# Patient Record
Sex: FEMALE | Race: WHITE | NOT HISPANIC OR LATINO | Employment: UNEMPLOYED | URBAN - METROPOLITAN AREA
[De-identification: names, ages, dates, MRNs, and addresses within clinical notes are randomized per-mention and may not be internally consistent; named-entity substitution may affect disease eponyms.]

---

## 2024-01-01 ENCOUNTER — APPOINTMENT (OUTPATIENT)
Dept: RADIOLOGY | Facility: MEDICAL CENTER | Age: 54
DRG: 368 | End: 2024-01-01
Attending: INTERNAL MEDICINE
Payer: MEDICAID

## 2024-01-01 ENCOUNTER — HOSPITAL ENCOUNTER (INPATIENT)
Facility: MEDICAL CENTER | Age: 54
LOS: 1 days | End: 2024-05-30
Attending: STUDENT IN AN ORGANIZED HEALTH CARE EDUCATION/TRAINING PROGRAM | Admitting: INTERNAL MEDICINE
Payer: MEDICAID

## 2024-01-01 VITALS
OXYGEN SATURATION: 90 % | TEMPERATURE: 91.2 F | HEART RATE: 125 BPM | SYSTOLIC BLOOD PRESSURE: 92 MMHG | RESPIRATION RATE: 30 BRPM | BODY MASS INDEX: 33.93 KG/M2 | DIASTOLIC BLOOD PRESSURE: 55 MMHG | HEIGHT: 68 IN

## 2024-01-01 LAB
ABO + RH BLD: NORMAL
ABO GROUP BLD: ABNORMAL
ALBUMIN SERPL BCP-MCNC: 1.3 G/DL (ref 3.2–4.9)
ALBUMIN/GLOB SERPL: ABNORMAL G/DL
ALP SERPL-CCNC: 50 U/L (ref 30–99)
ALT SERPL-CCNC: 292 U/L (ref 2–50)
AMMONIA PLAS-SCNC: 338 UMOL/L (ref 11–45)
ANION GAP SERPL CALC-SCNC: 39 MMOL/L (ref 7–16)
APAP SERPL-MCNC: <5 UG/ML (ref 10–30)
AST SERPL-CCNC: 1466 U/L (ref 12–45)
BARCODED ABORH UBTYP: 5100
BARCODED ABORH UBTYP: 600
BARCODED ABORH UBTYP: 600
BARCODED ABORH UBTYP: 6200
BARCODED ABORH UBTYP: 7300
BARCODED ABORH UBTYP: 8400
BARCODED ABORH UBTYP: 9500
BARCODED PRD CODE UBPRD: NORMAL
BARCODED UNIT NUM UBUNT: NORMAL
BASE EXCESS BLDA CALC-SCNC: -23 MMOL/L (ref -4–3)
BASE EXCESS BLDA CALC-SCNC: -26 MMOL/L (ref -4–3)
BASE EXCESS BLDA CALC-SCNC: -28 MMOL/L (ref -4–3)
BILIRUB SERPL-MCNC: 2.2 MG/DL (ref 0.1–1.5)
BLD GP AB SCN SERPL QL: ABNORMAL
BODY TEMPERATURE: ABNORMAL DEGREES
BREATHS SETTING VENT: 30
BUN SERPL-MCNC: 19 MG/DL (ref 8–22)
CALCIUM ALBUM COR SERPL-MCNC: 11.1 MG/DL (ref 8.5–10.5)
CALCIUM SERPL-MCNC: 8.9 MG/DL (ref 8.5–10.5)
CFT BLD TEG: 6.6 MIN (ref 4.6–9.1)
CFT BLD TEG: 7.7 MIN (ref 4.6–9.1)
CFT P HPASE BLD TEG: 6.1 MIN (ref 4.3–8.3)
CFT P HPASE BLD TEG: 7.3 MIN (ref 4.3–8.3)
CHLORIDE SERPL-SCNC: 103 MMOL/L (ref 96–112)
CLOT ANGLE BLD TEG: 55 DEGREES (ref 63–78)
CLOT ANGLE BLD TEG: 67.1 DEGREES (ref 63–78)
CLOT LYSIS 30M P MA LENFR BLD TEG: 0 % (ref 0–2.6)
CLOT LYSIS 30M P MA LENFR BLD TEG: >22 % (ref 0–2.6)
CO2 BLDA-SCNC: 6 MMOL/L (ref 20–33)
CO2 BLDA-SCNC: 6 MMOL/L (ref 20–33)
CO2 BLDA-SCNC: 7 MMOL/L (ref 20–33)
CO2 SERPL-SCNC: 10 MMOL/L (ref 20–33)
COMPONENT CT 8504CT: NORMAL
COMPONENT CT 8504CT: NORMAL
COMPONENT F 8504F: NORMAL
COMPONENT FT 8504FT: NORMAL
COMPONENT P 8504P: NORMAL
COMPONENT R 8504R: NORMAL
CREAT SERPL-MCNC: 1.62 MG/DL (ref 0.5–1.4)
CT.EXTRINSIC BLD ROTEM: 2 MIN (ref 0.8–2.1)
CT.EXTRINSIC BLD ROTEM: >5 MIN (ref 0.8–2.1)
DELSYS IDSYS: ABNORMAL
END TIDAL CARBON DIOXIDE IECO2: 16 MMHG
ERYTHROCYTE [DISTWIDTH] IN BLOOD BY AUTOMATED COUNT: 49.5 FL (ref 35.9–50)
ERYTHROCYTE [DISTWIDTH] IN BLOOD BY AUTOMATED COUNT: 49.5 FL (ref 35.9–50)
ERYTHROCYTE [DISTWIDTH] IN BLOOD BY AUTOMATED COUNT: 56.1 FL (ref 35.9–50)
FIBRINOGEN PPP-MCNC: <60 MG/DL (ref 215–460)
GFR SERPLBLD CREATININE-BSD FMLA CKD-EPI: 38 ML/MIN/1.73 M 2
GLOBULIN SER CALC-MCNC: ABNORMAL G/DL (ref 1.9–3.5)
GLUCOSE BLD STRIP.AUTO-MCNC: 102 MG/DL (ref 65–99)
GLUCOSE BLD STRIP.AUTO-MCNC: 162 MG/DL (ref 65–99)
GLUCOSE BLD STRIP.AUTO-MCNC: 176 MG/DL (ref 65–99)
GLUCOSE BLD STRIP.AUTO-MCNC: 25 MG/DL (ref 65–99)
GLUCOSE SERPL-MCNC: 38 MG/DL (ref 65–99)
HCO3 BLDA-SCNC: 4.8 MMOL/L (ref 17–25)
HCO3 BLDA-SCNC: 5.1 MMOL/L (ref 17–25)
HCO3 BLDA-SCNC: 6.6 MMOL/L (ref 17–25)
HCT VFR BLD AUTO: 21.1 % (ref 37–47)
HCT VFR BLD AUTO: 42.6 % (ref 37–47)
HCT VFR BLD AUTO: 44.7 % (ref 37–47)
HGB BLD-MCNC: 13.4 G/DL (ref 12–16)
HGB BLD-MCNC: 14.2 G/DL (ref 12–16)
HGB BLD-MCNC: 6.4 G/DL (ref 12–16)
HOROWITZ INDEX BLDA+IHG-RTO: 128 MM[HG]
INR PPP: 5.15 (ref 0.87–1.13)
LACTATE BLD-SCNC: >20 MMOL/L (ref 0.5–2)
LACTATE BLD-SCNC: >20 MMOL/L (ref 0.5–2)
LACTATE SERPL-SCNC: 29.7 MMOL/L (ref 0.5–2)
LACTATE SERPL-SCNC: 30.3 MMOL/L (ref 0.5–2)
LACTATE SERPL-SCNC: 30.6 MMOL/L (ref 0.5–2)
MAGNESIUM SERPL-MCNC: 2.4 MG/DL (ref 1.5–2.5)
MCF BLD TEG: 51.2 MM (ref 52–69)
MCF BLD TEG: <40 MM (ref 52–69)
MCF.PLATELET INHIB BLD ROTEM: 14.3 MM (ref 15–32)
MCF.PLATELET INHIB BLD ROTEM: 7.3 MM (ref 15–32)
MCH RBC QN AUTO: 28.8 PG (ref 27–33)
MCH RBC QN AUTO: 29.5 PG (ref 27–33)
MCH RBC QN AUTO: 29.6 PG (ref 27–33)
MCHC RBC AUTO-ENTMCNC: 30.3 G/DL (ref 32.2–35.5)
MCHC RBC AUTO-ENTMCNC: 31.5 G/DL (ref 32.2–35.5)
MCHC RBC AUTO-ENTMCNC: 31.8 G/DL (ref 32.2–35.5)
MCV RBC AUTO: 91.6 FL (ref 81.4–97.8)
MCV RBC AUTO: 92.9 FL (ref 81.4–97.8)
MCV RBC AUTO: 97.7 FL (ref 81.4–97.8)
MODE IMODE: ABNORMAL
O2/TOTAL GAS SETTING VFR VENT: 60 %
PA AA BLD-ACNC: ABNORMAL % (ref 0–11)
PA AA BLD-ACNC: ABNORMAL % (ref 0–11)
PA ADP BLD-ACNC: ABNORMAL % (ref 0–17)
PA ADP BLD-ACNC: ABNORMAL % (ref 0–17)
PCO2 BLDA: 25.6 MMHG (ref 26–37)
PCO2 BLDA: 28.6 MMHG (ref 26–37)
PCO2 BLDA: 29.4 MMHG (ref 26–37)
PCO2 TEMP ADJ BLDA: 21.9 MMHG (ref 26–37)
PCO2 TEMP ADJ BLDA: 25.1 MMHG (ref 26–37)
PCO2 TEMP ADJ BLDA: 26.3 MMHG (ref 26–37)
PEEP END EXPIRATORY PRESSURE IPEEP: 8 CMH20
PH BLDA: 6.86 [PH] (ref 7.4–7.5)
PH BLDA: 6.88 [PH] (ref 7.4–7.5)
PH BLDA: 6.96 [PH] (ref 7.4–7.5)
PH TEMP ADJ BLDA: 6.88 [PH] (ref 7.4–7.5)
PH TEMP ADJ BLDA: 6.92 [PH] (ref 7.4–7.5)
PH TEMP ADJ BLDA: 7 [PH] (ref 7.4–7.5)
PHOSPHATE SERPL-MCNC: 9.6 MG/DL (ref 2.5–4.5)
PLATELET # BLD AUTO: 32 K/UL (ref 164–446)
PLATELET # BLD AUTO: 89 K/UL (ref 164–446)
PLATELET # BLD AUTO: 99 K/UL (ref 164–446)
PLATELETS.RETICULATED NFR BLD AUTO: 12.5 % (ref 0.6–13.1)
PLATELETS.RETICULATED NFR BLD AUTO: 3 % (ref 0.6–13.1)
PLATELETS.RETICULATED NFR BLD AUTO: 5.8 % (ref 0.6–13.1)
PMV BLD AUTO: 10.1 FL (ref 9–12.9)
PMV BLD AUTO: 10.4 FL (ref 9–12.9)
PMV BLD AUTO: 12.1 FL (ref 9–12.9)
PO2 BLDA: 139 MMHG (ref 64–87)
PO2 BLDA: 62 MMHG (ref 64–87)
PO2 BLDA: 77 MMHG (ref 64–87)
PO2 TEMP ADJ BLDA: 128 MMHG (ref 64–87)
PO2 TEMP ADJ BLDA: 48 MMHG (ref 64–87)
PO2 TEMP ADJ BLDA: 61 MMHG (ref 64–87)
POTASSIUM SERPL-SCNC: 4.4 MMOL/L (ref 3.6–5.5)
PRODUCT TYPE UPROD: NORMAL
PROT SERPL-MCNC: 2.4 G/DL (ref 6–8.2)
PROTHROMBIN TIME: 48.3 SEC (ref 12–14.6)
RBC # BLD AUTO: 2.16 M/UL (ref 4.2–5.4)
RBC # BLD AUTO: 4.65 M/UL (ref 4.2–5.4)
RBC # BLD AUTO: 4.81 M/UL (ref 4.2–5.4)
RH BLD: ABNORMAL
SAO2 % BLDA: 75 % (ref 93–99)
SAO2 % BLDA: 82 % (ref 93–99)
SAO2 % BLDA: 96 % (ref 93–99)
SODIUM SERPL-SCNC: 152 MMOL/L (ref 135–145)
SPECIMEN DRAWN FROM PATIENT: ABNORMAL
TEG ALGORITHM TGALG: ABNORMAL
TEG ALGORITHM TGALG: ABNORMAL
TIDAL VOLUME IVT: 390 ML
UNIT STATUS USTAT: NORMAL
WBC # BLD AUTO: 4.3 K/UL (ref 4.8–10.8)
WBC # BLD AUTO: 5.3 K/UL (ref 4.8–10.8)
WBC # BLD AUTO: 8.7 K/UL (ref 4.8–10.8)

## 2024-01-01 PROCEDURE — 85576 BLOOD PLATELET AGGREGATION: CPT | Mod: 91

## 2024-01-01 PROCEDURE — 37799 UNLISTED PX VASCULAR SURGERY: CPT

## 2024-01-01 PROCEDURE — 770001 HCHG ROOM/CARE - MED/SURG/GYN PRIV*

## 2024-01-01 PROCEDURE — 94799 UNLISTED PULMONARY SVC/PX: CPT

## 2024-01-01 PROCEDURE — 99153 MOD SED SAME PHYS/QHP EA: CPT

## 2024-01-01 PROCEDURE — 80143 DRUG ASSAY ACETAMINOPHEN: CPT

## 2024-01-01 PROCEDURE — P9012 CRYOPRECIPITATE EACH UNIT: HCPCS

## 2024-01-01 PROCEDURE — 82962 GLUCOSE BLOOD TEST: CPT | Mod: 91

## 2024-01-01 PROCEDURE — 86900 BLOOD TYPING SEROLOGIC ABO: CPT

## 2024-01-01 PROCEDURE — 86901 BLOOD TYPING SEROLOGIC RH(D): CPT

## 2024-01-01 PROCEDURE — 700105 HCHG RX REV CODE 258: Performed by: INTERNAL MEDICINE

## 2024-01-01 PROCEDURE — 85610 PROTHROMBIN TIME: CPT | Mod: 91

## 2024-01-01 PROCEDURE — 700101 HCHG RX REV CODE 250: Performed by: INTERNAL MEDICINE

## 2024-01-01 PROCEDURE — 82803 BLOOD GASES ANY COMBINATION: CPT | Mod: 91

## 2024-01-01 PROCEDURE — 83605 ASSAY OF LACTIC ACID: CPT | Mod: 91

## 2024-01-01 PROCEDURE — 85384 FIBRINOGEN ACTIVITY: CPT

## 2024-01-01 PROCEDURE — 36430 TRANSFUSION BLD/BLD COMPNT: CPT

## 2024-01-01 PROCEDURE — 84100 ASSAY OF PHOSPHORUS: CPT

## 2024-01-01 PROCEDURE — 700111 HCHG RX REV CODE 636 W/ 250 OVERRIDE (IP): Mod: JZ

## 2024-01-01 PROCEDURE — 700117 HCHG RX CONTRAST REV CODE 255: Performed by: INTERNAL MEDICINE

## 2024-01-01 PROCEDURE — 85347 COAGULATION TIME ACTIVATED: CPT | Mod: 91

## 2024-01-01 PROCEDURE — 700111 HCHG RX REV CODE 636 W/ 250 OVERRIDE (IP): Performed by: INTERNAL MEDICINE

## 2024-01-01 PROCEDURE — 82140 ASSAY OF AMMONIA: CPT

## 2024-01-01 PROCEDURE — 99292 CRITICAL CARE ADDL 30 MIN: CPT | Mod: 25 | Performed by: INTERNAL MEDICINE

## 2024-01-01 PROCEDURE — 94003 VENT MGMT INPAT SUBQ DAY: CPT

## 2024-01-01 PROCEDURE — 80053 COMPREHEN METABOLIC PANEL: CPT | Mod: 91

## 2024-01-01 PROCEDURE — P9017 PLASMA 1 DONOR FRZ W/IN 8 HR: HCPCS | Mod: 91

## 2024-01-01 PROCEDURE — P9034 PLATELETS, PHERESIS: HCPCS

## 2024-01-01 PROCEDURE — C1751 CATH, INF, PER/CENT/MIDLINE: HCPCS

## 2024-01-01 PROCEDURE — 03HY32Z INSERTION OF MONITORING DEVICE INTO UPPER ARTERY, PERCUTANEOUS APPROACH: ICD-10-PCS | Performed by: INTERNAL MEDICINE

## 2024-01-01 PROCEDURE — P9016 RBC LEUKOCYTES REDUCED: HCPCS | Mod: 91

## 2024-01-01 PROCEDURE — 0DJ08ZZ INSPECTION OF UPPER INTESTINAL TRACT, VIA NATURAL OR ARTIFICIAL OPENING ENDOSCOPIC: ICD-10-PCS | Performed by: INTERNAL MEDICINE

## 2024-01-01 PROCEDURE — 5A1935Z RESPIRATORY VENTILATION, LESS THAN 24 CONSECUTIVE HOURS: ICD-10-PCS | Performed by: STUDENT IN AN ORGANIZED HEALTH CARE EDUCATION/TRAINING PROGRAM

## 2024-01-01 PROCEDURE — 85027 COMPLETE CBC AUTOMATED: CPT | Mod: 91

## 2024-01-01 PROCEDURE — 36556 INSERT NON-TUNNEL CV CATH: CPT | Performed by: INTERNAL MEDICINE

## 2024-01-01 PROCEDURE — 86850 RBC ANTIBODY SCREEN: CPT

## 2024-01-01 PROCEDURE — 05HM33Z INSERTION OF INFUSION DEVICE INTO RIGHT INTERNAL JUGULAR VEIN, PERCUTANEOUS APPROACH: ICD-10-PCS | Performed by: INTERNAL MEDICINE

## 2024-01-01 PROCEDURE — 36620 INSERTION CATHETER ARTERY: CPT | Performed by: INTERNAL MEDICINE

## 2024-01-01 PROCEDURE — 71275 CT ANGIOGRAPHY CHEST: CPT

## 2024-01-01 PROCEDURE — 160048 HCHG OR STATISTICAL LEVEL 1-5: Performed by: INTERNAL MEDICINE

## 2024-01-01 PROCEDURE — 85055 RETICULATED PLATELET ASSAY: CPT | Mod: 91

## 2024-01-01 PROCEDURE — 71045 X-RAY EXAM CHEST 1 VIEW: CPT

## 2024-01-01 PROCEDURE — 700111 HCHG RX REV CODE 636 W/ 250 OVERRIDE (IP): Mod: JZ | Performed by: INTERNAL MEDICINE

## 2024-01-01 PROCEDURE — 86923 COMPATIBILITY TEST ELECTRIC: CPT | Mod: 91

## 2024-01-01 PROCEDURE — 160202 HCHG ENDO MINUTES - 1ST 30 MINS LEVEL 3: Performed by: INTERNAL MEDICINE

## 2024-01-01 PROCEDURE — 36556 INSERT NON-TUNNEL CV CATH: CPT

## 2024-01-01 PROCEDURE — C9113 INJ PANTOPRAZOLE SODIUM, VIA: HCPCS | Performed by: INTERNAL MEDICINE

## 2024-01-01 PROCEDURE — 99254 IP/OBS CNSLTJ NEW/EST MOD 60: CPT | Performed by: INTERNAL MEDICINE

## 2024-01-01 PROCEDURE — 36620 INSERTION CATHETER ARTERY: CPT

## 2024-01-01 PROCEDURE — 43255 EGD CONTROL BLEEDING ANY: CPT | Performed by: INTERNAL MEDICINE

## 2024-01-01 PROCEDURE — 99291 CRITICAL CARE FIRST HOUR: CPT | Mod: 25 | Performed by: INTERNAL MEDICINE

## 2024-01-01 PROCEDURE — 94002 VENT MGMT INPAT INIT DAY: CPT

## 2024-01-01 PROCEDURE — 83735 ASSAY OF MAGNESIUM: CPT

## 2024-01-01 PROCEDURE — 85384 FIBRINOGEN ACTIVITY: CPT | Mod: 91

## 2024-01-01 PROCEDURE — 160207 HCHG ENDO MINUTES - EA ADDL 1 MIN LEVEL 3: Performed by: INTERNAL MEDICINE

## 2024-01-01 RX ORDER — MIDAZOLAM HYDROCHLORIDE 1 MG/ML
1-5 INJECTION INTRAMUSCULAR; INTRAVENOUS
Status: DISCONTINUED | OUTPATIENT
Start: 2024-01-01 | End: 2024-05-31 | Stop reason: HOSPADM

## 2024-01-01 RX ORDER — LORAZEPAM 2 MG/ML
1 CONCENTRATE ORAL
Status: DISCONTINUED | OUTPATIENT
Start: 2024-01-01 | End: 2024-05-31 | Stop reason: HOSPADM

## 2024-01-01 RX ORDER — DEXTROSE MONOHYDRATE 25 G/50ML
50 INJECTION, SOLUTION INTRAVENOUS ONCE
Status: COMPLETED | OUTPATIENT
Start: 2024-01-01 | End: 2024-01-01

## 2024-01-01 RX ORDER — PHENYLEPHRINE HCL IN 0.9% NACL 1 MG/10 ML
400 SYRINGE (ML) INTRAVENOUS ONCE
Status: COMPLETED | OUTPATIENT
Start: 2024-01-01 | End: 2024-01-01

## 2024-01-01 RX ORDER — SODIUM BICARBONATE IN D5W 150/1000ML
PLASTIC BAG, INJECTION (ML) INTRAVENOUS CONTINUOUS
Status: DISCONTINUED | OUTPATIENT
Start: 2024-01-01 | End: 2024-05-31 | Stop reason: HOSPADM

## 2024-01-01 RX ORDER — MIDAZOLAM HYDROCHLORIDE 1 MG/ML
INJECTION INTRAMUSCULAR; INTRAVENOUS
Status: COMPLETED
Start: 2024-01-01 | End: 2024-01-01

## 2024-01-01 RX ORDER — GLYCOPYRROLATE 0.2 MG/ML
0.2 INJECTION INTRAMUSCULAR; INTRAVENOUS 3 TIMES DAILY PRN
Status: DISCONTINUED | OUTPATIENT
Start: 2024-01-01 | End: 2024-05-31 | Stop reason: HOSPADM

## 2024-01-01 RX ORDER — GLYCOPYRROLATE 1 MG/1
1 TABLET ORAL 3 TIMES DAILY PRN
Status: DISCONTINUED | OUTPATIENT
Start: 2024-01-01 | End: 2024-05-31 | Stop reason: HOSPADM

## 2024-01-01 RX ORDER — EPINEPHRINE HCL IN 0.9 % NACL 4MG/250ML
0-.5 PLASTIC BAG, INJECTION (ML) INTRAVENOUS CONTINUOUS
Status: DISCONTINUED | OUTPATIENT
Start: 2024-01-01 | End: 2024-05-31 | Stop reason: HOSPADM

## 2024-01-01 RX ORDER — ONDANSETRON 2 MG/ML
8 INJECTION INTRAMUSCULAR; INTRAVENOUS EVERY 8 HOURS PRN
Status: DISCONTINUED | OUTPATIENT
Start: 2024-01-01 | End: 2024-05-31 | Stop reason: HOSPADM

## 2024-01-01 RX ORDER — THIAMINE HYDROCHLORIDE 100 MG/ML
100 INJECTION, SOLUTION INTRAMUSCULAR; INTRAVENOUS DAILY
Status: DISCONTINUED | OUTPATIENT
Start: 2024-06-02 | End: 2024-05-31 | Stop reason: HOSPADM

## 2024-01-01 RX ORDER — BISACODYL 10 MG
10 SUPPOSITORY, RECTAL RECTAL
Status: DISCONTINUED | OUTPATIENT
Start: 2024-01-01 | End: 2024-05-31 | Stop reason: HOSPADM

## 2024-01-01 RX ORDER — HYDROMORPHONE HYDROCHLORIDE 1 MG/ML
.5-2 INJECTION, SOLUTION INTRAMUSCULAR; INTRAVENOUS; SUBCUTANEOUS
Status: DISCONTINUED | OUTPATIENT
Start: 2024-01-01 | End: 2024-01-01

## 2024-01-01 RX ORDER — FAMOTIDINE 20 MG/1
20 TABLET, FILM COATED ORAL EVERY 12 HOURS
Status: DISCONTINUED | OUTPATIENT
Start: 2024-01-01 | End: 2024-01-01

## 2024-01-01 RX ORDER — ROCURONIUM BROMIDE 10 MG/ML
100 INJECTION, SOLUTION INTRAVENOUS ONCE
Status: COMPLETED | OUTPATIENT
Start: 2024-01-01 | End: 2024-01-01

## 2024-01-01 RX ORDER — POLYETHYLENE GLYCOL 3350 17 G/17G
1 POWDER, FOR SOLUTION ORAL
Status: DISCONTINUED | OUTPATIENT
Start: 2024-01-01 | End: 2024-05-31 | Stop reason: HOSPADM

## 2024-01-01 RX ORDER — DEXMEDETOMIDINE HYDROCHLORIDE 4 UG/ML
.1-1.5 INJECTION, SOLUTION INTRAVENOUS CONTINUOUS
Status: DISCONTINUED | OUTPATIENT
Start: 2024-01-01 | End: 2024-05-31 | Stop reason: HOSPADM

## 2024-01-01 RX ORDER — AMOXICILLIN 250 MG
2 CAPSULE ORAL 2 TIMES DAILY
Status: DISCONTINUED | OUTPATIENT
Start: 2024-01-01 | End: 2024-05-31 | Stop reason: HOSPADM

## 2024-01-01 RX ORDER — ATROPINE SULFATE 10 MG/ML
2 SOLUTION/ DROPS OPHTHALMIC EVERY 4 HOURS PRN
Status: DISCONTINUED | OUTPATIENT
Start: 2024-01-01 | End: 2024-05-31 | Stop reason: HOSPADM

## 2024-01-01 RX ORDER — MIDAZOLAM HYDROCHLORIDE 1 MG/ML
4 INJECTION INTRAMUSCULAR; INTRAVENOUS ONCE
Status: COMPLETED | OUTPATIENT
Start: 2024-01-01 | End: 2024-01-01

## 2024-01-01 RX ORDER — NOREPINEPHRINE BITARTRATE 0.03 MG/ML
0-1 INJECTION, SOLUTION INTRAVENOUS CONTINUOUS
Status: DISCONTINUED | OUTPATIENT
Start: 2024-01-01 | End: 2024-05-31 | Stop reason: HOSPADM

## 2024-01-01 RX ORDER — CALCIUM CHLORIDE 100 MG/ML
1 INJECTION INTRAVENOUS; INTRAVENTRICULAR ONCE
Status: COMPLETED | OUTPATIENT
Start: 2024-01-01 | End: 2024-01-01

## 2024-01-01 RX ORDER — ONDANSETRON 4 MG/1
8 TABLET, ORALLY DISINTEGRATING ORAL EVERY 8 HOURS PRN
Status: DISCONTINUED | OUTPATIENT
Start: 2024-01-01 | End: 2024-05-31 | Stop reason: HOSPADM

## 2024-01-01 RX ADMIN — NOREPINEPHRINE BITARTRATE 1 MCG/KG/MIN: 1 INJECTION INTRAVENOUS at 16:45

## 2024-01-01 RX ADMIN — PANTOPRAZOLE SODIUM 8 MG/HR: 40 INJECTION, POWDER, FOR SOLUTION INTRAVENOUS at 11:28

## 2024-01-01 RX ADMIN — SODIUM BICARBONATE 100 MEQ: 84 INJECTION INTRAVENOUS at 10:15

## 2024-01-01 RX ADMIN — SODIUM BICARBONATE 100 MEQ: 84 INJECTION INTRAVENOUS at 11:45

## 2024-01-01 RX ADMIN — NOREPINEPHRINE BITARTRATE 1 MCG/KG/MIN: 1 INJECTION INTRAVENOUS at 14:54

## 2024-01-01 RX ADMIN — THIAMINE HYDROCHLORIDE 500 MG: 100 INJECTION, SOLUTION INTRAMUSCULAR; INTRAVENOUS at 18:37

## 2024-01-01 RX ADMIN — OCTREOTIDE ACETATE 50 MCG/HR: 200 INJECTION, SOLUTION INTRAVENOUS; SUBCUTANEOUS at 11:28

## 2024-01-01 RX ADMIN — EPINEPHRINE 0.5 MCG/KG/MIN: 1 INJECTION INTRAMUSCULAR; INTRAVENOUS; SUBCUTANEOUS at 12:20

## 2024-01-01 RX ADMIN — PANTOPRAZOLE SODIUM 8 MG/HR: 40 INJECTION, POWDER, FOR SOLUTION INTRAVENOUS at 21:46

## 2024-01-01 RX ADMIN — MIDAZOLAM HYDROCHLORIDE 4 MG: 1 INJECTION, SOLUTION INTRAMUSCULAR; INTRAVENOUS at 13:53

## 2024-01-01 RX ADMIN — Medication 400 MCG: at 13:53

## 2024-01-01 RX ADMIN — MIDAZOLAM HYDROCHLORIDE 5 MG: 1 INJECTION, SOLUTION INTRAMUSCULAR; INTRAVENOUS at 22:46

## 2024-01-01 RX ADMIN — IOHEXOL 100 ML: 350 INJECTION, SOLUTION INTRAVENOUS at 12:57

## 2024-01-01 RX ADMIN — SODIUM BICARBONATE 50 MEQ: 84 INJECTION INTRAVENOUS at 12:57

## 2024-01-01 RX ADMIN — Medication 100 MEQ: at 10:15

## 2024-01-01 RX ADMIN — SODIUM BICARBONATE 50 MEQ: 84 INJECTION INTRAVENOUS at 10:18

## 2024-01-01 RX ADMIN — SODIUM BICARBONATE: 84 INJECTION, SOLUTION INTRAVENOUS at 12:07

## 2024-01-01 RX ADMIN — MIDAZOLAM HYDROCHLORIDE 4 MG: 1 INJECTION INTRAMUSCULAR; INTRAVENOUS at 13:53

## 2024-01-01 RX ADMIN — SODIUM BICARBONATE 100 MEQ: 84 INJECTION INTRAVENOUS at 13:54

## 2024-01-01 RX ADMIN — NOREPINEPHRINE BITARTRATE 0.5 MCG/KG/MIN: 1 INJECTION INTRAVENOUS at 19:47

## 2024-01-01 RX ADMIN — NOREPINEPHRINE BITARTRATE 1 MCG/KG/MIN: 1 INJECTION INTRAVENOUS at 11:45

## 2024-01-01 RX ADMIN — MORPHINE SULFATE 10 MG: 10 INJECTION INTRAVENOUS at 22:46

## 2024-01-01 RX ADMIN — ROCURONIUM BROMIDE 100 MG: 10 SOLUTION INTRAVENOUS at 13:51

## 2024-01-01 RX ADMIN — SODIUM BICARBONATE 100 MEQ: 84 INJECTION INTRAVENOUS at 11:53

## 2024-01-01 RX ADMIN — CALCIUM CHLORIDE 1 G: 100 INJECTION, SOLUTION INTRAVENOUS at 13:57

## 2024-01-01 RX ADMIN — DEXTROSE MONOHYDRATE 50 ML: 25 INJECTION, SOLUTION INTRAVENOUS at 10:25

## 2024-01-01 RX ADMIN — SODIUM BICARBONATE 50 MEQ: 84 INJECTION INTRAVENOUS at 13:51

## 2024-01-01 RX ADMIN — SODIUM CHLORIDE 1000 MG: 900 INJECTION INTRAVENOUS at 18:15

## 2024-01-01 ASSESSMENT — PAIN DESCRIPTION - PAIN TYPE
TYPE: ACUTE PAIN

## 2024-01-10 PROBLEM — Z78.0 POST-MENOPAUSAL: Status: ACTIVE | Noted: 2024-01-01

## 2024-05-30 PROBLEM — J96.01 ACUTE RESPIRATORY FAILURE WITH HYPOXIA (HCC): Status: ACTIVE | Noted: 2024-01-01

## 2024-05-30 PROBLEM — D62 ACUTE BLOOD LOSS ANEMIA: Status: ACTIVE | Noted: 2024-01-01

## 2024-05-30 PROBLEM — R57.8 HEMORRHAGIC SHOCK (HCC): Status: ACTIVE | Noted: 2024-01-01

## 2024-05-30 PROBLEM — E87.20 LACTIC ACIDOSIS: Status: ACTIVE | Noted: 2024-01-01

## 2024-05-30 PROBLEM — F10.20 ALCOHOL DEPENDENCE (HCC): Status: ACTIVE | Noted: 2024-01-01

## 2024-05-30 PROBLEM — N17.9 ACUTE KIDNEY INJURY (HCC): Status: ACTIVE | Noted: 2024-01-01

## 2024-05-30 PROBLEM — K92.2 GI BLEED: Status: ACTIVE | Noted: 2024-01-01

## 2024-05-30 NOTE — ASSESSMENT & PLAN NOTE
Intubated 5/30  ABCDEF bundle  She is not a candidate for SAT/SBT at this time  Mobility level 1 at this time

## 2024-05-30 NOTE — ASSESSMENT & PLAN NOTE
S/P 4 units of PRBCs at outside hospital  I transfused a total of 5 red boxes over the course of the day  I administered IV bicarbonate solution and IV calcium chloride immediately on arrival  I am titrating norepinephrine and epinephrine to keep mean arterial pressure greater than 65

## 2024-05-30 NOTE — PROCEDURES
OPERATIVE REPORT    PATIENT:   Giselle GOSS Robert   1970       PREOPERATIVE DIAGNOSES/INDICATIONS: massive hemorrhage, alcohol use disorder, history of duodenal ulcers    POSTOPERATIVE DIAGNOSIS: esophageal Dieulafoy vs. Esophageal varix with nipple actively bleeding    PROCEDURE:  ESOPHAGOGASTRODUODENOSCOPY with failed band ligation x 2    PHYSICIAN:  Christine Thornton MD    ANESTHESIA:  Per intensivist    LOCATION: [facility]    CONSENT:  Emergent 2 MD consent    DESCRIPTION: The patient was seen bedside in the ICU.  Patient is intubated on mechanical ventilation.  Intensivist was bedside for procedure in addition to several ICU RNs infusing blood and critical care medications.  Vital signs were being monitored every 5 minutes.  A time out was called.  Bite block was in place.  The 2T therapeutic endoscope was inserted into the esophagus.      Once the site was reached and examined, the upper endoscope was withdrawn.  Retroflexion was made within the stomach.  The stomach was decompressed, scope was withdrawn and the procedure was terminated.    OPERATIVE FINDINGS:    1. Esophagus: after aggressive lavage and suction a nipple like lesion was noted to be actively bleeding at approximately 34 cm.  Poor visualization due to massive volume of blood in lumen.  The endoscope was changed to a 1 T therapeutic scope and the banding device was applied.  Suspect patient may have decompressed esophageal varices since mucosa could not be captured to place band twice.  Discussion with intensivist that hemoclipping would not be appropriate on a varix nor epinephrine and cautery and patient should go to IR.  .  Procedure was stopped after IR contacted and agreed to emergent CTA with possible treatment of bleeding lesion.    2. Stomach: large amount of clot and fresh blood in proximal stomach and unable to visualize mucosa in upper third of stomach.  No active bleeding in body or antrum.  3. Duodenum: No active bleeding in lumen and  no mucosal lesions appreciated.    RECOMMENDATIONS:  Patient has received her second Red Box during this procedure  Continue octreotide and pantopraozle  Patient going to CTA stat.  I have discussed with IR.        Procedure time was 55 minutes which is 40 minutes longer than standard EGD

## 2024-05-30 NOTE — CONSULTS
Gastroenterology Initial Consult Note               Author:  Christine Thornton M.D. Date & Time Created: 5/30/2024 11:04 AM       Patient ID:  Name:             Giselle Jones  YOB: 1970  Age:                 54 y.o.  female  MRN:               [id number]      Referring Provider:  ***        Presenting Chief Complaint:  ***      History of Present Illness:    This is a 54 y.o. female transferred to [facility] today after being found down and an approximate liter of blood next to her.  EMS reports SBP 30's and she was taken to [facility] where she was intubated.  She was started on epinephrine and given 6 units PRBC and an additional 3 units with EMS.  She has been given 4 units PRBC here for a total of 9 units.  Additionally she has received FFP and platelets.    CHART REVIEW  She had an ultrasound in December at [facility] and had which demonstrated fatty change to liver, patent portal vein with PV 12mm and no ascites.  .   EGD on Jan 8, 2024 at [facility] demonstrated 2 duodenal bulb ulcers wilthout bleeding and erosive gastritis.  Biopsies were negative for H pylori.  She has a history of AUD and started drinking prior to hospitalization in [facility].    She was seen at [facility] GI clinic on April 29 and reported not doing well.  She had an episode of hematemesis 1 week prior to that visit.  She also had mnelena days after the hematemesis.  She admitted to missing doses of pantoprazole.  She was noted to have multiple ecchymmoses on physical exam.  Admitted to taking ibuprofen and drinking 3 vodka dinks daily        Review of Systems:  Review of Systems   Unable to perform ROS: Critical illness             Past Medical History:  Past Medical History:   Diagnosis Date    Bowel habit changes     Per pt on 1/10/24, no evidence of overt blood in stool since discharge from hospital on 1/9/24.    CKD (chronic kidney disease), stage I     Heart burn     Upper GI bleed, hospitalized 01/07/24.  Discharged with pantoprazole and sucralfate.    Hypothyroidism     Not currently taking medication. Off per PCP instruction. Las taken Summer/Fall 2023.    Psychiatric problem     Anxiety & Depression    Shortness of breath 05/05/2016    Pt denies any SOB on 1/10/24. Pt thinks this past dx was secondary to allergies and sinus infections.     Active Hospital Problems    Diagnosis     GI bleed [K92.2]          Past Surgical History:  Past Surgical History:   Procedure Laterality Date    ORIF, WRIST Right 1/12/2024    Procedure: OPEN REDUCTION INTERNAL FIXATION OF RIGHT WRIST DISTAL RADIUS;  Surgeon: Jered Craig M.D.;  Location: [facility] [place];  Service: Orthopedics    ORIF, ANKLE Left 9/25/2023    Procedure: OPEN REDUCTION INTERNAL FIXATION LEFT BIMALLEOLAR ANKLE FRACTURE, LEFT ANKLE DELTOID REPAIR, LEFT ANKLE SYNDESMOSIS REPAIR, LEFT COLATERAL LIGAMENT REPAIR;  Surgeon: Thien Bee M.D.;  Location: SURGERY [place];  Service: Orthopedics    TONSILLECTOMY AND ADENOIDECTOMY             Hospital Medications:  Current Facility-Administered Medications   Medication Dose Frequency Provider Last Rate Last Admin    pantoprazole (Protonix) 80 mg in  mL continuous infusion  8 mg/hr Continuous Jerrica Peters D.O.        octreotide (SandoSTATIN) 1,250 mcg in  mL Infusion  50 mcg/hr Continuous Jerrica Peters D.O.        Respiratory Therapy Consult   Continuous RT Jerrica Peters D.O.        famotidine (Pepcid) tablet 20 mg  20 mg Q12HRS Jerrica Peters D.O.        Or    famotidine (Pepcid) injection 20 mg  20 mg Q12HRS YULI OkeefeO.        senna-docusate (Pericolace Or Senokot S) 8.6-50 MG per tablet 2 Tablet  2 Tablet BID Jerrica Peters D.O.        And    polyethylene glycol/lytes (Miralax) Packet 1 Packet  1 Packet QDAY PRN Jerrica Peters D.O.        And    magnesium hydroxide (Milk Of Magnesia) suspension 30 mL  30 mL QDAY PRN Jerrica Peters D.O.        And     bisacodyl (Dulcolax) suppository 10 mg  10 mg QDAY PRN Jerrica Peters D.O.        MD Alert...ICU Electrolyte Replacement per Pharmacy   PHARMACY TO DOSE Jerrica Petesr D.O.        lidocaine (Xylocaine) 1 % injection 2 mL  2 mL Q30 MIN PRN Jerrica Peters D.O.       Last reviewed on 5/30/2024  8:26 AM by Nelsy Escobedo R.N.       Current Outpatient Medications:  Medications Prior to Admission   Medication Sig Dispense Refill Last Dose    gabapentin (NEURONTIN) 300 MG Cap TAKE 1 CAPSULE BY MOUTH THREE TIMES DAILY AS NEEDED FOR PAIN NOT  CONTROLLED  BY  TYLENOL  FOR  UP  TO  15  DAYS 45 Capsule 0     ondansetron (ZOFRAN ODT) 4 MG TABLET DISPERSIBLE Take 1 Tablet by mouth every 6 hours as needed for Nausea/Vomiting. 10 Tablet 0     sucralfate (CARAFATE) 1 GM Tab Take 1 g by mouth.       pantoprazole (PROTONIX) 40 MG Tablet Delayed Response Take 40 mg by mouth.       ketoconazole (NIZORAL) 2 % shampoo WASH FOREHEAD, EYEBROWS AND AROUND THE EARS DAILY, ALLOW TO SIT 1-2 MINUTES PRIOR TO RINSING (Patient not taking: Reported on 5/14/2024)       triamcinolone acetonide (KENALOG) 0.1 % Cream APPLY CREAM EXTERNALLY TWICE DAILY FOR 14 DAYS AND THEN DECREASE TO ONCE DAILY WITH A MAX USE OF 5 DAYS PER WEEK (Patient not taking: Reported on 5/14/2024)       diphenhydrAMINE-APAP, sleep, (TYLENOL PM EXTRA STRENGTH PO) Take  by mouth.       acetaminophen (TYLENOL) 500 MG Tab Take 2 Tablets by mouth every 8 hours. 30 Tablet 0     vitamin D2, Ergocalciferol, (DRISDOL) 1.25 MG (36204 UT) Cap capsule TAKE 1 CAPSULE BY MOUTH EVERY TWO WEEKS       loratadine/pseudo SR (PX ALLERGY RELIEF D, LORATID,) 5-120 MG TABLET SR 12 HR Take 1 Tablet by mouth 2 times a day.       potassium chloride SA (KDUR) 20 MEQ Tab CR Take 1 Tablet by mouth 2 times a day. 14 Tablet 0          Medication Allergies:  Allergies   Allergen Reactions    Latex Rash     Welts and rash     Oxycodone-Acetaminophen Vomiting     Per pt, she has tolerated  oxycodone and acetaminophen individually.    Pcn [Penicillins] Hives    Sulfa Drugs Hives     Reaction as a child    Tape Rash     Adhesives tend to cause rash         Family Medical History:  Family History   Problem Relation Age of Onset    Arthritis Mother     Psychiatric Illness Mother     Heart Disease Father     Hypertension Father     Psychiatric Illness Brother          Social History:  Social History     Socioeconomic History    Marital status:      Spouse name: Not on file    Number of children: Not on file    Years of education: 14    Highest education level: Some college, no degree   Occupational History    Not on file   Tobacco Use    Smoking status: Never    Smokeless tobacco: Never   Vaping Use    Vaping status: Never Used   Substance and Sexual Activity    Alcohol use: Not Currently     Comment: Average 2 drinks per month    Drug use: No    Sexual activity: Yes     Partners: Male   Other Topics Concern     Service No    Blood Transfusions No    Caffeine Concern No    Occupational Exposure No    Hobby Hazards No    Sleep Concern Yes    Stress Concern Yes    Weight Concern Yes    Special Diet No    Back Care No    Exercise No    Bike Helmet No    Seat Belt Yes    Self-Exams Yes   Social History Narrative    Not on file     Social Determinants of Health     Financial Resource Strain: High Risk (9/19/2023)    Overall Financial Resource Strain (CARDIA)     Difficulty of Paying Living Expenses: Very hard   Food Insecurity: Not on file (1/8/2024)   Recent Concern: Food Insecurity - High Risk (1/8/2024)    Received from [facility]    SINCERE Food Insecurity     In the last month, did you feel there was not enough money for food?: Yes   Transportation Needs: Not on file (1/8/2024)   Physical Activity: Patient Declined (9/19/2023)    Exercise Vital Sign     Days of Exercise per Week: Patient declined     Minutes of Exercise per Session: Patient declined   Stress: Patient Declined  "(9/19/2023)    [employer] - Occupational Stress Questionnaire     Feeling of Stress : Patient declined   Social Connections: Moderately Isolated (9/19/2023)    Social Connection and Isolation Panel [NHANES]     Frequency of Communication with Friends and Family: More than three times a week     Frequency of Social Gatherings with Friends and Family: Twice a week     Attends Spiritism Services: Never     Active Member of Clubs or Organizations: No     Attends Club or Organization Meetings: 1 to 4 times per year     Marital Status:    Intimate Partner Violence: Low Risk  (1/7/2024)    Received from [facility]    History of Abuse     Have you ever been afraid of, threatened, neglected, or abused by someone?: No   Housing Stability: Not on file (1/8/2024)   Recent Concern: Housing Stability - High Risk (1/8/2024)    Received from [facility]    SINCERE Housing Needs     In the last month, was there a time when you were not able to pay your mortgage or rent?: Yes     In the last month, have you slept outside, in a shelter, in a car, or any place not meant for sleeping?: Not on file         Vital signs:  Weight/BMI: Body mass index is 33.93 kg/m².  /63   Pulse (!) 111   Resp (!) 31   Ht 1.727 m (5' 7.99\")   SpO2 100%   Vitals:    05/30/24 1054 05/30/24 1056 05/30/24 1058 05/30/24 1100   BP: (!) 142/64 138/67 (!) 140/66 136/63   Pulse: (!) 112 (!) 112 (!) 112 (!) 111   Resp: (!) 32 (!) 34 (!) 29 (!) 31   SpO2: 100% 100% 100% 100%   Height:         Oxygen Therapy:  Pulse Oximetry: 100 %, FiO2%: 100 %  No intake or output data in the 24 hours ending 05/30/24 1104      Physical Exam:  Physical Exam  Constitutional:       Comments: Obtunded, agonal breathing, fresh blood coming from mouth and suction with fresh blood per rectum   HENT:      Head: Normocephalic and atraumatic.      Nose: Nose normal.      Mouth/Throat:      Mouth: Mucous membranes are " dry.   Cardiovascular:      Rate and Rhythm: Regular rhythm. Tachycardia present.   Pulmonary:      Breath sounds: Rhonchi present.      Comments: intubated  Abdominal:      General: There is distension.      Palpations: Abdomen is soft.   Skin:     Comments: Toes are dusky                 Labs:  Recent Labs     05/30/24 0723   SODIUM 145   POTASSIUM 4.9   CHLORIDE 106   CO2 9*   BUN 18   CREATININE 1.9*   CALCIUM 7.5*     Recent Labs     05/30/24 0723   ALTSGPT 119*   ASTSGOT 693*   ALKPHOSPHAT 90   TBILIRUBIN 3.5*   GLUCOSE 98     Recent Labs     05/30/24  0723   WBC 4.6*   ASTSGOT 693*   ALTSGPT 119*   ALKPHOSPHAT 90   TBILIRUBIN 3.5*     Recent Labs     05/30/24 0723   RBC 2.16*   HEMOGLOBIN 6.2*   HEMATOCRIT 22.8*   PLATELETCT 74*   PROTHROMBTM 19.2*   APTT 36.3*   INR 1.92     Recent Results (from the past 24 hour(s))   CBC WITH DIFFERENTIAL    Collection Time: 05/30/24  7:23 AM   Result Value Ref Range    WBC 4.6 (L) 4.8 - 10.8 K/uL    RBC 2.16 (L) 4.20 - 5.40 M/uL    Hemoglobin 6.2 (LL) 13.0 - 17.0 g/dL    Hematocrit 22.8 (L) 39.0 - 50.0 %    .6 (H) 81.0 - 99.0 fL    MCH 28.7 27.0 - 31.0 pg    MCHC 27.2 (L) 33.0 - 37.0 g/dL    RDW 17.0 (H) 11.5 - 14.5 %    Platelet Count 74 (L) 130 - 400 K/uL    MPV 13.0 (H) 7.4 - 10.4 fL    Neutrophils Automated 26.0 (L) 39.0 - 70.0 %    Lymphocytes Automated 64.8 (H) 21.0 - 50.0 %    Monocytes Automated 4.8 2.0 - 9.0 %    Eosinophils Automated 0.0 0.0 - 5.0 %    Basophils Automated 0.4 0.0 - 3.0 %    Abs Neutrophils Automated 1.2 (L) 1.8 - 7.7 K/uL    Abs Lymph Automated 3.0 1.2 - 4.8 K/uL    Eosinophil Count, Blood 0.00 0.00 - 0.50 K/uL   DIAGNOSTIC ALCOHOL    Collection Time: 05/30/24  7:23 AM   Result Value Ref Range    Ethyl Alcohol -Ethanol  Etoh 151 (H) 0 - 10 mg/dL   Comp Metabolic Panel    Collection Time: 05/30/24  7:23 AM   Result Value Ref Range    Sodium 145 136 - 145 mmol/L    Potassium 4.9 3.5 - 5.1 mmol/L    Chloride 106 98 - 107 mmol/L    Co2 9  (LL) 21 - 32 mmol/L    Anion Gap 35 (H) 10 - 18 mmol/L    Glucose 98 74 - 99 mg/dL    Bun 18 7 - 18 mg/dL    Creatinine 1.9 (H) 0.6 - 1.0 mg/dL    Calcium 7.5 (L) 8.5 - 11.0 mg/dL    AST(SGOT) 693 (H) 15 - 37 U/L    ALT(SGPT) 119 (H) 12 - 78 U/L    Alkaline Phosphatase 90 46 - 116 U/L    Total Bilirubin 3.5 (H) 0.2 - 1.0 mg/dL    Albumin 1.5 (L) 3.4 - 5.0 g/dL    Total Protein 4.1 (L) 6.4 - 8.2 g/dL    A-G Ratio 0.6    Prothrombin Time    Collection Time: 05/30/24  7:23 AM   Result Value Ref Range    PT 19.2 (H) 9.3 - 11.7 sec    INR 1.92    APTT    Collection Time: 05/30/24  7:23 AM   Result Value Ref Range    APTT 36.3 (H) 22.8 - 31.5 sec   LACTIC ACID    Collection Time: 05/30/24  7:23 AM   Result Value Ref Range    Lactic Acid 26.5 (HH) 0.4 - 2.0 mmol/L   VENOUS BLOOD GAS    Collection Time: 05/30/24  7:23 AM   Result Value Ref Range    Venous Bg Ph 6.79 (LL) 7.31 - 7.45    Venous Bg Pco2 34.3 (L) 41.0 - 51.0 mmHg    Venous Bg Po2 111.6 (H) 25.0 - 40.0 mmHg    Venous Bg O2 Saturation 94.8 %    Venous Bg Hco3 5 (L) 24 - 28 mmol/L    Venous Bg Base Excess -28 mmol/L   HCG QUANTITATIVE    Collection Time: 05/30/24  7:23 AM   Result Value Ref Range    Bhcg <2.0 0.0 - 6.0 mIU/mL   ESTIMATED GFR    Collection Time: 05/30/24  7:23 AM   Result Value Ref Range    GFR (CKD-EPI) 31 (A) >60 mL/min/1.73 m 2   Prepare RBC    Collection Time: 05/30/24  7:23 AM   Result Value Ref Range    Crossmatch Unit 1 Compatible     Crossmatch Unit 2 Compatible     Crossmatch Unit 3 Compatible     Crossmatch Unit 4 Compatible     Packed Red Cell 1 Transfused     Packed Red Cell 2 Transfused     Packed Red Cell 3 Transfused     Packed Red Cell 4 Transfused    Prepare FFP    Collection Time: 05/30/24  7:23 AM   Result Value Ref Range    FFP Unit #1 Transfused    Prepare RBC    Collection Time: 05/30/24  7:30 AM   Result Value Ref Range    Crossmatch Unit 1 Compatible     Crossmatch Unit 2 Compatible     Packed Red Cell 1 Transfused      Packed Red Cell 2 Transfused    ARTERIAL BLOOD GAS    Collection Time: 05/30/24  8:40 AM   Result Value Ref Range    Ph 6.85 (LL) 7.35 - 7.45    Pco2 28 (L) 35 - 45 mmHg    Po2 154 (H) 65 - 85 mmHg    O2 Saturation 98.0 (H) 89.0 - 94.0 %    Hco3 4.8 (L) 20.0 - 29.0 mmol/L    Base Excess -26.7 (L) -3.0 - 4.0 mmol/L   MASSIVE TRANSFUSION    Collection Time: 05/30/24 10:21 AM   Result Value Ref Range    Component F       LP                  LiquidPlasmaIRR     [id number]   issued       05/30/24   10:21      Product Type LP     Dispense Status issued     Unit Number (Barcoded) [id number]     Product Code (Barcoded) V4636Y16     Blood Type (Barcoded) 0600     Component F       LP                  LiquidPlasmaIRR     [id number]   issued       05/30/24   10:21      Product Type LP     Dispense Status issued     Unit Number (Barcoded) [id number]     Product Code (Barcoded) [id number]     Blood Type (Barcoded) 6200     Component F       LP                  LiquidPlasmaIRR     [id number]   issued       05/30/24   10:21      Product Type LP     Dispense Status issued     Unit Number (Barcoded) [id number]     Product Code (Barcoded) K1007D32     Blood Type (Barcoded) 6200     Component F       LP                  LiquidPlasmaIRR     [id number]   issued       05/30/24   10:21      Product Type LP     Dispense Status issued     Unit Number (Barcoded) [id number]     Product Code (Barcoded) N2610P62     Blood Type (Barcoded) 6200     Component P       P71                 Plts,Pheresis       [id number]   issued       05/30/24   10:21      Product Type Platelets Pheresis LR     Dispense Status issued     Unit Number (Barcoded) [id number]     Product Code (Barcoded) R6248J02     Blood Type (Barcoded) 6200     Component R       R4                  Red Blood Cells     [id number]   issued       05/30/24   10:21      Product Type Red Blood Cells LR Pheresis     Dispense Status issued     Unit Number  (Barcoded) [id number]     Product Code (Barcoded) B0656G71     Blood Type (Barcoded) 9500     Component R       R99                 Red Cells, LR       [id number]   issued       05/30/24   10:21      Product Type R99     Dispense Status issued     Unit Number (Barcoded) [id number]     Product Code (Barcoded) U2355J86     Blood Type (Barcoded) 9500     Component R       R4                  Red Blood Cells     [id number]   issued       05/30/24   10:21      Product Type Red Blood Cells LR Pheresis     Dispense Status issued     Unit Number (Barcoded) [id number]     Product Code (Barcoded) Q0997B46     Blood Type (Barcoded) 9500     Component R       R99                 Red Cells, LR       [id number]   issued       05/30/24   10:21      Product Type R99     Dispense Status issued     Unit Number (Barcoded) [id number]     Product Code (Barcoded) X6526O12     Blood Type (Barcoded) 9500          Radiology Review:  No orders to display         MDM (Data Review):   -Records reviewed and summarized in current documentation  -I personally reviewed and interpreted the laboratory results  -I personally reviewed the radiology images    Assessment/Recommendations:    Hemorrhagic shock  Hematemesis with hematochezia  Acute blood loss anemia, s/p 13 units PRBC  Acute respiratory failure  Thrombocytopenia  Lactic acidosis  Transaminitis:  ischemic injury +/- alcoholic hepatitis  AUD  Severe hypoalbuminemia:  ?due to massive bleeding +/- alcohol abuse  Coagulopathy      RECS:  Emergent bedside EGD in ICU  Pantoprazole drip  May need octreotide drip  Further recommendations based on findings          Christine Thornton M.D.          Core Quality Measures   Reviewed items:  Labs, Medications and Radiology reports reviewed

## 2024-05-30 NOTE — PROCEDURES
Date of service:  5/30/2024    Title:  Central venous catheter placement - internal jugular vein    Indication: Hemorrhagic shock    Narrative:    A time out was performed identifying the correct patient, correct procedure and correct location prior to this procedure.    The right neck was prepped with chlorhexidine and draped in the usual sterile fashion.  1% Xylocaine solution was used for topical anesthesia.  A 9 F Arrowg+jack Blue MAC multilumen central venous catheter was placed into the right internal jugular vein under ultrasound guidance using the technique described by Kris without difficulty or apparent complication.  The guidewire was removed intact.  The line was sutured into place and a sterile dressing was placed over the line.  All ports flush and return venous blood easily.  The patient tolerated the procedure quite nicely.  No complications are apparent.  A STAT CXR is ordered to confirm placement.  The catheter may be safely used for infusion and medication administration.    This was an EMERGENT procedure.  It was medically necessary to perform this procedure emergently to prevent life threatening deterioration.      Dinesh Joaquin MD  Pulmonary and Critical Care Medicine

## 2024-05-30 NOTE — PROGRESS NOTES
Patient arrived to room by care flight, intensivist called to bedside, patient connected to monitor

## 2024-05-30 NOTE — ASSESSMENT & PLAN NOTE
History of duodenal ulcer on prior EGD in January 2024  Emergent EGD with bleeding esophageal varix on 5/30 - this was not able to be banded  Emergent placement of [place] tube on 5/30  Octreotide infusion  Pantoprazole infusion  Trend hemoglobin and transfuse PRBCs to be hemoglobin greater than 7  Trend thromboelastogram with platelet mapping and transfuse additional blood products based upon interpretation

## 2024-05-30 NOTE — PROGRESS NOTES
[facility] DIRECT ADMIT PROGRESS NOTE    Transferring facility: [place]  Transferring provider: Dr. Harp    Chief complaint: GIB  Pertinent history & patient course:   54F with history of etoh abuse, GIB (duodenal ulcers) who presented with profound hemorrhagic shock and GIB. She was found down by her boyfriend (unknown period of down time) with apparently a liter of bloody vomit by her. She was noted to be hypotensive to SBP 30s and hypoxic by EMS - given epi pushes and bagged with bloody emesis while bagging her. She was intubated in the ER and femoral cordis was placed. She was started on epi gtt and given 4u pRBCs. Hgb was 6 prior to transfusions and pending repeat. Also noted to be having active melena.  She's not on any sedation post intubation but noted pupils at 4mm and fixed and non-responsive currently.   Pertinent imaging & lab results: as noted above  Consultants called prior to transfer and pertinent input from consultants:   GI reviewed case and recommended transfer to regional for possible CTA A/P and IR intervention if needed.  Code Status: FULL per transferring provider, I personally verified with the transferring provider patient's code status and the transferring provider has confirmed this with the patient.  Reason for Transfer: GI bleed  Further work up or recommendations requested prior to transfer:   - give 1u FFP and platelets since she got 4u pRBCs while pending transfer    Patient accepted for transfer: Yes  Accepting [facility] Facility: [facility] - Nursing to notify the Triage Coordinator in the RTOC via Voalte or Phone ext. [phone] when patient arrives to the unit. The Triage Coordinator will assign the admitting provider.    Consultants to be called upon arrival: GI  Admission status: Inpatient.   Floor requested: ICU    The admitting provider is the point of contact for questions or concerns regarding the patient's care.         Megan Magaña MD  Pulmonary  and Critical Care Medicine  Formerly [facility]

## 2024-05-30 NOTE — H&P
PULMONARY AND CRITICAL CARE MEDICINE HISTORY AND PHYSICAL EXAMINATION    Date of Admission:  5/30/2024    Admitting Physician:  Dinesh Joaquin MD    Reason for Admission: Hemorrhagic shock due to massive upper gastrointestinal hemorrhage    Chief Complaint: Hemorrhagic shock due to massive upper gastrointestinal hemorrhage    History of Present Illness:    I was kindly asked to see and evaluate Giselle Jones, a 54 y.o. female for evaluation and management of the above problem.    The entire history is obtained from healthcare providers and the medical record as this lady cannot provide me with any history.  This lady has a history of alcohol dependence with continued drinking, duodenal ulcers in January 2024, depression and hypothyroidism.  She apparently was found down by her boyfriend earlier this morning.  She has been having some vomiting with tremors.  EMS at the scene reported approximately half a liter of bright red blood present in her emesis.  She was hypotensive in the field with a systolic blood pressure in the 30s.  She was hypoxemic with oxygen saturations in the 50s.  She was taken to an outside facility and intubated.  She was transfused 4 units of packed red blood cells.  Her hemoglobin at the outside facility was 6.2 with a platelet count of 74,000.  She had a creatinine of 1.9 and a lactic acid of 26.5.  Her blood alcohol was 0.151.  She was transferred to [facility] for subspecialty care.    Medications Prior to Admission:    No current facility-administered medications on file prior to encounter.     Current Outpatient Medications on File Prior to Encounter   Medication Sig Dispense Refill    gabapentin (NEURONTIN) 300 MG Cap TAKE 1 CAPSULE BY MOUTH THREE TIMES DAILY AS NEEDED FOR PAIN NOT  CONTROLLED  BY  TYLENOL  FOR  UP  TO  15  DAYS 45 Capsule 0    ondansetron (ZOFRAN ODT) 4 MG TABLET DISPERSIBLE Take 1 Tablet by mouth every 6 hours as needed for Nausea/Vomiting. 10 Tablet 0     sucralfate (CARAFATE) 1 GM Tab Take 1 g by mouth.      pantoprazole (PROTONIX) 40 MG Tablet Delayed Response Take 40 mg by mouth.      ketoconazole (NIZORAL) 2 % shampoo WASH FOREHEAD, EYEBROWS AND AROUND THE EARS DAILY, ALLOW TO SIT 1-2 MINUTES PRIOR TO RINSING (Patient not taking: Reported on 5/14/2024)      triamcinolone acetonide (KENALOG) 0.1 % Cream APPLY CREAM EXTERNALLY TWICE DAILY FOR 14 DAYS AND THEN DECREASE TO ONCE DAILY WITH A MAX USE OF 5 DAYS PER WEEK (Patient not taking: Reported on 5/14/2024)      diphenhydrAMINE-APAP, sleep, (TYLENOL PM EXTRA STRENGTH PO) Take  by mouth.      acetaminophen (TYLENOL) 500 MG Tab Take 2 Tablets by mouth every 8 hours. 30 Tablet 0    vitamin D2, Ergocalciferol, (DRISDOL) 1.25 MG (16191 UT) Cap capsule TAKE 1 CAPSULE BY MOUTH EVERY TWO WEEKS      loratadine/pseudo SR (PX ALLERGY RELIEF D, LORATID,) 5-120 MG TABLET SR 12 HR Take 1 Tablet by mouth 2 times a day.      potassium chloride SA (KDUR) 20 MEQ Tab CR Take 1 Tablet by mouth 2 times a day. 14 Tablet 0       Current Medications:      Current Facility-Administered Medications:     pantoprazole (Protonix) 80 mg in  mL continuous infusion, 8 mg/hr, Intravenous, Continuous, YULI OkeefeO., Last Rate: 10 mL/hr at 05/30/24 1128, 8 mg/hr at 05/30/24 1128    octreotide (SandoSTATIN) 1,250 mcg in  mL Infusion, 50 mcg/hr, Intravenous, Continuous, YULI OkeefeO., Last Rate: 10 mL/hr at 05/30/24 1128, 50 mcg/hr at 05/30/24 1128    Respiratory Therapy Consult, , Nebulization, Continuous RT, Jerrica Peters D.O.    senna-docusate (Pericolace Or Senokot S) 8.6-50 MG per tablet 2 Tablet, 2 Tablet, Enteral Tube, BID **AND** polyethylene glycol/lytes (Miralax) Packet 1 Packet, 1 Packet, Enteral Tube, QDAY PRN **AND** magnesium hydroxide (Milk Of Magnesia) suspension 30 mL, 30 mL, Enteral Tube, QDAY PRN **AND** bisacodyl (Dulcolax) suppository 10 mg, 10 mg, Rectal, QDAY PRN, Jerrica Peters,  BELGICA.    MD Alert...ICU Electrolyte Replacement per Pharmacy, , Other, PHARMACY TO DOSE, Jerrica Peters D.O.    lidocaine (Xylocaine) 1 % injection 2 mL, 2 mL, Tracheal Tube, Q30 MIN PRN, Jerrica Peters D.O.    thiamine (B-1) 500 mg in dextrose 5% 100 mL IVPB, 500 mg, Intravenous, DAILY **FOLLOWED BY** [START ON 6/2/2024] thiamine (B-1) injection 100 mg, 100 mg, Intravenous, DAILY, Dinesh Joaquin M.D.    dexmedetomidine (PRECEDEX) 400 mcg/100mL NS premix infusion, 0.1-1.5 mcg/kg/hr (Ideal), Intravenous, Continuous, Dinesh Joaquin M.D., Held at 05/30/24 1145    HYDROmorphone (Dilaudid) injection 0.5-2 mg, 0.5-2 mg, Intravenous, Q HOUR PRN, Dinesh Joaquin M.D.    norepinephrine (Levophed) 8 mg in 250 mL NS infusion (premix), 0-1 mcg/kg/min (Ideal), Intravenous, Continuous, Dinesh Joaquin M.D., Last Rate: 119.8 mL/hr at 05/30/24 1645, 1 mcg/kg/min at 05/30/24 1645    sodium bicarbonate 150 mEq in D5W infusion (premix), , Intravenous, Continuous, Dinesh Joaquin M.D., Last Rate: 250 mL/hr at 05/30/24 1207, New Bag at 05/30/24 1207    EPINEPHrine (Adrenalin) infusion 4 mg/250 mL (premix), 0-0.5 mcg/kg/min (Ideal), Intravenous, Continuous, Dinesh Joaquin M.D., Last Rate: 12 mL/hr at 05/30/24 1643, 0.05 mcg/kg/min at 05/30/24 1643    Tranexamic Acid (Cyklokapron) 1,000 mg in  mL IVPB, 1,000 mg, Intravenous, Once, Dinesh Joaquin M.D.    Allergies:    Latex, Oxycodone-acetaminophen, Pcn [penicillins], Sulfa drugs, and Tape    Past Surgical History:    Past Surgical History:   Procedure Laterality Date    UT UPPER GI ENDOSCOPY,DIAGNOSIS  5/30/2024    Procedure: GASTROSCOPY;  Surgeon: Christine Thornton M.D.;  Location: SURGERY SAME DAY [facility];  Service: Gastroenterology    ORIF, WRIST Right 1/12/2024    Procedure: OPEN REDUCTION INTERNAL FIXATION OF RIGHT WRIST DISTAL RADIUS;  Surgeon: Jered Craig M.D.;  Location: SURGERY [place];  Service:  Orthopedics    ORIF, ANKLE Left 9/25/2023    Procedure: OPEN REDUCTION INTERNAL FIXATION LEFT BIMALLEOLAR ANKLE FRACTURE, LEFT ANKLE DELTOID REPAIR, LEFT ANKLE SYNDESMOSIS REPAIR, LEFT COLATERAL LIGAMENT REPAIR;  Surgeon: Thien Bee M.D.;  Location: SURGERY [place];  Service: Orthopedics    TONSILLECTOMY AND ADENOIDECTOMY         Past Medical History:    Past Medical History:   Diagnosis Date    Bowel habit changes     Per pt on 1/10/24, no evidence of overt blood in stool since discharge from hospital on 1/9/24.    CKD (chronic kidney disease), stage I     Heart burn     Upper GI bleed, hospitalized 01/07/24. Discharged with pantoprazole and sucralfate.    Hypothyroidism     Not currently taking medication. Off per PCP instruction. Las taken Summer/Fall 2023.    Psychiatric problem     Anxiety & Depression    Shortness of breath 05/05/2016    Pt denies any SOB on 1/10/24. Pt thinks this past dx was secondary to allergies and sinus infections.       Social History:    Social History     Socioeconomic History    Marital status:      Spouse name: Not on file    Number of children: Not on file    Years of education: 14    Highest education level: Some college, no degree   Occupational History    Not on file   Tobacco Use    Smoking status: Never    Smokeless tobacco: Never   Vaping Use    Vaping status: Never Used   Substance and Sexual Activity    Alcohol use: Not Currently     Comment: Average 2 drinks per month    Drug use: No    Sexual activity: Yes     Partners: Male   Other Topics Concern     Service No    Blood Transfusions No    Caffeine Concern No    Occupational Exposure No    Hobby Hazards No    Sleep Concern Yes    Stress Concern Yes    Weight Concern Yes    Special Diet No    Back Care No    Exercise No    Bike Helmet No    Seat Belt Yes    Self-Exams Yes   Social History Narrative    Not on file     Social Determinants of Health     Financial Resource Strain: High Risk (9/19/2023)     Overall Financial Resource Strain (CARDIA)     Difficulty of Paying Living Expenses: Very hard   Food Insecurity: Not on file (1/8/2024)   Recent Concern: Food Insecurity - High Risk (1/8/2024)    Received from [facility]    SINCERE Food Insecurity     In the last month, did you feel there was not enough money for food?: Yes   Transportation Needs: Not on file (1/8/2024)   Physical Activity: Patient Declined (9/19/2023)    Exercise Vital Sign     Days of Exercise per Week: Patient declined     Minutes of Exercise per Session: Patient declined   Stress: Patient Declined (9/19/2023)    Citizen of [place] [employer] - Occupational Stress Questionnaire     Feeling of Stress : Patient declined   Social Connections: Moderately Isolated (9/19/2023)    Social Connection and Isolation Panel [NHANES]     Frequency of Communication with Friends and Family: More than three times a week     Frequency of Social Gatherings with Friends and Family: Twice a week     Attends Baptism Services: Never     Active Member of Clubs or Organizations: No     Attends Club or Organization Meetings: 1 to 4 times per year     Marital Status:    Intimate Partner Violence: Low Risk  (1/7/2024)    Received from [facility]    History of Abuse     Have you ever been afraid of, threatened, neglected, or abused by someone?: No   Housing Stability: Not on file (1/8/2024)   Recent Concern: Housing Stability - High Risk (1/8/2024)    Received from [facility]    SINCERE Housing Needs     In the last month, was there a time when you were not able to pay your mortgage or rent?: Yes     In the last month, have you slept outside, in a shelter, in a car, or any place not meant for sleeping?: Not on file       Family History:    Family History   Problem Relation Age of Onset    Arthritis Mother     Psychiatric Illness Mother     Heart Disease Father     Hypertension Father     Psychiatric Illness  "Brother        Review of System:    Review of Systems   Unable to perform ROS: Acuity of condition       Physical Examination:    BP 99/54   Pulse 100   Temp (!) 32.9 °C (91.2 °F) (Temporal)   Resp (!) 30   Ht 1.727 m (5' 7.99\")   LMP 01/01/2022 (Approximate)   SpO2 93%   BMI 33.93 kg/m²   Physical Exam  Constitutional:       Appearance: She is obese. She is ill-appearing.      Comments: On ventilator   HENT:      Mouth/Throat:      Mouth: Mucous membranes are dry.   Cardiovascular:      Comments: Sinus tachycardia  Pulmonary:      Breath sounds: Rales (Scattered crackles) present. No wheezing.   Abdominal:      General: There is distension.   Musculoskeletal:      Right lower leg: No edema.      Left lower leg: No edema.   Skin:     Coloration: Skin is pale.   Neurological:      Comments: She is unresponsive         Laboratory Data:    Recent Labs     05/30/24  0840 05/30/24  1126 05/30/24  1509   FQHBM21X 6.85*  --   --    UETWTG772I 28*  --   --    [id number] 154*  --   --    SPBN3SPM 98.0*  --   --    ARTHCO3 4.8*  --   --    ARTBE -26.7*  --   --    ISTATAPH  --  6.859* 6.957*   ISTATAPCO2  --  28.6 29.4   ISTATAPO2  --  139* 62*   ISTATATCO2  --  6* 7*   [id number]  --  96 75*   ISTATARTHCO3  --  5.1* 6.6*   ISTATARTBE  --  -26* -23*   ISTATTEMP  --  35.1 C 33.4 C   ISTATSPEC  --  Arterial Arterial   ISTATAPHTC  --  6.880* 7.000*   [id number]  --  128* 48*     Recent Labs     05/30/24  0723 05/30/24  1020 05/30/24  1709   WBC 4.1* 8.7 5.3   RBC 2.16* 2.16* 4.81   HEMOGLOBIN 6.2* 6.4* 14.2   HEMATOCRIT 22.8* 21.1* 44.7   .6* 97.7 92.9   MCH 28.7 29.6 29.5   MCHC 27.2* 30.3* 31.8*   RDW 17.0* 56.1* 49.5   PLATELETCT 74* 32* 99*   MPV 13.0* 12.1 10.1     Recent Labs     05/30/24  0723 05/30/24  1020   SODIUM 145 152*   POTASSIUM 4.9 4.4   CHLORIDE 106 103   CO2 9* 10*   GLUCOSE 98 38*   BUN 18 19   CREATININE 1.9* 1.62*   CALCIUM 7.5* 8.9                   Imaging:    I personally viewed all the " available CXR and CT scan images as well as reviewed the radiology interpretation reports.    DX-ABDOMEN FOR TUBE PLACEMENT   Final Result      1.  Enteric tube tip projects over the upper mid abdomen likely in the distal stomach.      CT-CTA COMPLETE THORACOABDOMINAL AORTA   Final Result      1.  No evidence of thoracic or abdominal aortic aneurysm or dissection.   2.  No evidence of active bleeding identified in the region of the esophagus or stomach.   3.  There is a fluid-filled distended esophagus with some internal density which would be consistent with blood products as was seen on recent EGD.   4.  There is a fluid filled distended stomach with blood products and an air-fluid level.   5.  There are distal esophageal and gastrohepatic ligament varices.   6.  There is mild hepatomegaly with diffuse hepatic steatosis.   7.  There are a few tiny gallstones with no biliary dilatation.   8.  Fluid-filled loops of large and small bowel can be seen with gastroenteritis or diarrhea. No gross bowel obstruction.            DX-CHEST-PORTABLE (1 VIEW)   Final Result      1.  Satisfactory appearance of the tubes and lines without pneumothorax.   2.  Mildly enlarged cardiac silhouette with central vascular congestion and probable right lower lobe atelectasis.      DX-ABDOMEN FOR TUBE PLACEMENT   Final Result      1.  Enteric tube tip appears to project over the midline distal abdomen and probably is within a distended stomach.          Assessment and Plan:    * Hemorrhagic shock (HCC)  Assessment & Plan  S/P 4 units of PRBCs at outside hospital  I transfused a total of 5 red boxes over the course of the day  I administered IV bicarbonate solution and IV calcium chloride immediately on arrival  I am titrating norepinephrine and epinephrine to keep mean arterial pressure greater than 65    Acute respiratory failure with hypoxia (HCC)  Assessment & Plan  Intubated 5/30  ABCDEF bundle  She is not a candidate for SAT/SBT at  this time  Mobility level 1 at this time    GI bleed- (present on admission)  Assessment & Plan  History of duodenal ulcer on prior EGD in January 2024  Emergent EGD with bleeding esophageal varix on 5/30 - this was not able to be banded  Emergent placement of [place] tube on 5/30  Octreotide infusion  Pantoprazole infusion  Trend hemoglobin and transfuse PRBCs to be hemoglobin greater than 7  Trend thromboelastogram with platelet mapping and transfuse additional blood products based upon interpretation    Acute kidney injury (HCC)  Assessment & Plan  Due to hemorrhagic shock  Monitor renal function and urine output  Avoid nephrotoxins    Lactic acidosis  Assessment & Plan  Due to hemorrhagic shock    Acute blood loss anemia  Assessment & Plan  Gastrointestinal source of blood loss  Trend hemoglobin and transfuse PRBCs to be hemoglobin greater than 7    Alcohol dependence (HCC)  Assessment & Plan  History of alcohol dependence and blood alcohol of 0.151 on presentation to outside facility  High-dose IV thiamine and supplemental vitamins  Cessation counseling when clinically appropriate        High risk of deterioration and worsening vital organ dysfunction and death without the above critical care interventions.    I have assessed and reassessed her respiratory status with ventilator adjustments, airway mechanics, ventilator waveforms, blood pressure, hemodynamics, cardiovascular status with titration of epinephrine and norepinephrine, serial determinations of her hemoglobin, platelet count and coagulation status with transfusion of multiple blood products.  She is at high risk for worsening respiratory, cardiovascular, gastrointestinal, renal and CNS system dysfunction.    The patient is critically ill.  Critical care time = 225 minutes in directly providing and coordinating critical care and extensive data review.  No time overlap and excludes procedures.    Discussed with Dr. Thornton, FATIMAH. RT, Clinical  pharmacist, Family    Dinesh Joaquin MD  Pulmonary and Critical Care Medicine

## 2024-05-30 NOTE — ASSESSMENT & PLAN NOTE
Gastrointestinal source of blood loss  Trend hemoglobin and transfuse PRBCs to be hemoglobin greater than 7

## 2024-05-30 NOTE — CARE PLAN
Problem: Ventilation  Goal: Ability to achieve and maintain unassisted ventilation or tolerate decreased levels of ventilator support  Description: Target End Date:  4 days     Document on Vent flowsheet    1.  Support and monitor invasive and noninvasive mechanical ventilation  2.  Monitor ventilator weaning response  3.  Perform ventilator associated pneumonia prevention interventions  4.  Manage ventilation therapy by monitoring diagnostic test results  Outcome: Not Met     Ventilator Daily Summary    Vent Day #1  Airway: 7.5    Ventilator settings: 30 [phone] 50  Weaning trials: no  Respiratory Procedures:     Plan: Continue current ventilator settings and wean mechanical ventilation as tolerated per physician orders.

## 2024-05-30 NOTE — PROCEDURES
Date of service:  5/30/2024    Title:  Arterial catheter placement - axillary artery    Indication: Hemorrhagic shock    Narrative:    A time out was performed identifying the correct patient, correct procedure and correct location for this procedure prior to performing this procedure.    The right arm was prepped with chlorhexidine and draped in the usual sterile fashion.  A 20 gauge arterial catheter was placed into the right axillary artery under ultrasound guidance using the technique described by Kris without difficulty or apparent complication.  The guidewire was removed intact.  The line was sutured into place and a sterile dressing was placed over the line.  An outstanding arterial waveform is present on the monitor.  The patient tolerated the procedure quite nicely.  No complications are apparent.    This was an EMERGENT procedure.  It was medically necessary to perform this procedure emergently to prevent life threatening deterioration.      Dinesh Joaquin MD  Pulmonary and Critical Care Medicine

## 2024-05-30 NOTE — PROGRESS NOTES
Patient transferred to IR1 table, attached to monitors.  ICU RN and RT with patient.  Procedure canceled. Patient transferred back to ICU by ICU RN and RT.

## 2024-05-30 NOTE — PROGRESS NOTES
Summary of course events of day: (Late entry)     1020: central line placement by MD Joaquin, 1023 timeout, 1034 wire out    1025: Blood sugar check of 25, order received for 1 amp D50, repeat blood sugar 15 minutes following administration 175.    1030: Patient hypotensive, uncontrolled bright red emesis noted. Red box #1 transfused per massive transfusion protocol     1055: Arterial line by MD Joaquin. 1058 time out, 1103 wire out.     1150: Patient continues to be hypotensive despite titration of epinephrine and norepinephrine, massive amounts of bright red emesis continue to be present. Red box #2 transfused per massive transfusion protocol.     1230: MD Thornton to bedside for bedside endoscopy    1253: Red box #3 transfused    1320: Patient transported to CT scanner with RT, x4 RN on monitor and portable ventilator.     1400: Red box #4 transfused    1500: [place] tube inserted by MD Joaquin    1520: Red box #5 massive transfusion protocol initiated. After administration of 4 units PRBCs, vital signs stable, patient hypertensive systolic 140s. Orders received by Wilmer Pinto to terminate transfusion, TEG and CBC lab orders received.

## 2024-05-31 NOTE — PROGRESS NOTES
Late entry:     Lab notified this RN of critical labs. RN with Dr. Joaquin, phone called placed on speaker. Critical lactic of 29.7, critical glucose of 32, Ammonia of 338, AST of 1466, Fibrinogen < 60, LY > 22. D50 given, red boxes transfused, cryo given, Continuous fluids initiated at 250 ml/hr.

## 2024-05-31 NOTE — DISCHARGE SUMMARY
Death Summary    Cause of Death  Hemorrhagic shock due to gastrointestinal hemorrhage due to esophageal varix due to alcoholic liver disease.    Comorbid Conditions at the Time of Death  Principal Problem:    Hemorrhagic shock (HCC) (POA: Unknown)  Active Problems:    GI bleed (POA: Yes)    Acute respiratory failure with hypoxia (HCC) (POA: Unknown)    Acute blood loss anemia (POA: Unknown)    Lactic acidosis (POA: Unknown)    Acute kidney injury (HCC) (POA: Unknown)    Alcohol dependence (HCC) (POA: Unknown)  Resolved Problems:    * No resolved hospital problems. *      History of Presenting Illness and Hospital Course  This is a 54 y.o. female admitted 2024 with hemorrhagic shock due to an upper gastrointestinal hemorrhage.  She was emergently transfused using the [employer]o Rapid Infuser through an emergently placed large bore central venous catheter.  She received a massive amount of blood products including PRBCs, FFP, platelets and cryoprecipitate.  She additionally received TXA.  An emergent EGD in the ICU revealed that she was actively bleeding from her esophagus.  She appeared to be bleeding from an esophageal varix.  A CTA of the aorta did not reveal active extravasation, but revealed evidence of alcoholic liver disease with distal esophageal and gastrohepatic ligament varices.  A [place] tube was placed to control ongoing hemorrhaging.  High-dose vasopressors were titrated.  Goals of care discussions were held with her family.  Tragically, it had become evident that this lady would not survive.  She was transition to comfort measures only and  peacefully.    Death Date: 24   Death Time:          Pronounced By (RN1):   Pronounced By (RN2): Ramone Joaquin MD  Pulmonary and Critical Care Medicine

## 2024-05-31 NOTE — PROGRESS NOTES
4 eyes skin note - patient too unstable to evaluate backside for full 4 eyes skin assessment. [place] tube in place requiring minimal patient movement.     Front of skin assessed, pressure points offloaded with pillows, foam pad placed under [place] tube

## 2024-05-31 NOTE — CARE PLAN
The patient is Unstable - High likelihood or risk of patient condition declining or worsening    Shift Goals  Clinical Goals: hemodynamic stability  Patient Goals: BRENTON  Family Goals: BRENTON    Progress made toward(s) clinical / shift goals:    Problem: Pain - Standard  Goal: Alleviation of pain or a reduction in pain to the patient’s comfort goal  Outcome: Progressing     Problem: Risk for Fluid Volume Deficit Related to Bleeding  Goal: Fluid volume balance will be maintained  Outcome: Progressing  Note: Patient's fluid status stable with administration of blood products       Patient is not progressing towards the following goals:

## 2024-05-31 NOTE — PROGRESS NOTES
Pulmonary and Critical Care Medicine Progress Note    This lady has suffered a massive upper gastrointestinal hemorrhage due to esophageal varices.  She is in hemorrhagic shock.  Since arriving at [facility], she has been transfused a total of 5 red boxes.  I reviewed the case with this lady's mother, this lady's daughter at the bedside and this lady's boyfriend.  CPR in the event of cardiac arrest would not be beneficial in achieving the goal of saving this lady's life.  Her CODE STATUS is DNR.  She has a [place] tube in place.  If she has continued hemorrhaging, I do not believe that transfusion of blood products would be beneficial in saving her life.  I explained all of this to her family.  I answered all of their questions and they understand.    Dinesh Joaquin MD  Pulmonary and Critical Care Medicine

## 2024-05-31 NOTE — PROGRESS NOTES
Pulmonary/Critical Care Medicine   Progress Note    Date of service: 5/30/2024  Time: 22:15    Patient's mother arrived this evening with the patient's daughter, /boyfriend, and additional family members and again described the grave condition Symone was in and this is nonsurvivable.  The family agreed that Symone would not want to remain on life support and asked to transition to comfort focused care with compassionate extubation.    Kymberly Sanon MD  Pulmonary and Critical Care Medicine

## (undated) DEVICE — SPEEDBAND SUPERVIEW SUPER 7 (4/BX)

## (undated) DEVICE — KIT CUSTOM PROCEDURE SINGLE FOR ENDO  (15/CA)

## (undated) DEVICE — FILM CASSETTE ENDO